# Patient Record
Sex: FEMALE | Race: WHITE | Employment: UNEMPLOYED | URBAN - METROPOLITAN AREA
[De-identification: names, ages, dates, MRNs, and addresses within clinical notes are randomized per-mention and may not be internally consistent; named-entity substitution may affect disease eponyms.]

---

## 2021-04-28 ENCOUNTER — ANESTHESIA EVENT (OUTPATIENT)
Dept: OPERATING ROOM | Age: 71
End: 2021-04-28
Payer: COMMERCIAL

## 2021-04-28 ENCOUNTER — HOSPITAL ENCOUNTER (OUTPATIENT)
Age: 71
Setting detail: OUTPATIENT SURGERY
Discharge: HOME | End: 2021-04-28
Attending: OPHTHALMOLOGY | Admitting: OPHTHALMOLOGY
Payer: COMMERCIAL

## 2021-04-28 ENCOUNTER — ANESTHESIA (OUTPATIENT)
Dept: OPERATING ROOM | Age: 71
End: 2021-04-28
Payer: COMMERCIAL

## 2021-04-28 VITALS
HEART RATE: 64 BPM | SYSTOLIC BLOOD PRESSURE: 121 MMHG | BODY MASS INDEX: 20.76 KG/M2 | OXYGEN SATURATION: 98 % | TEMPERATURE: 97 F | WEIGHT: 137 LBS | RESPIRATION RATE: 17 BRPM | HEIGHT: 68 IN | DIASTOLIC BLOOD PRESSURE: 74 MMHG

## 2021-04-28 VITALS — OXYGEN SATURATION: 100 % | DIASTOLIC BLOOD PRESSURE: 73 MMHG | SYSTOLIC BLOOD PRESSURE: 128 MMHG

## 2021-04-28 LAB
GFR NON-AFRICAN AMERICAN: >60 ML/MIN
GFR SERPL CREATININE-BSD FRML MDRD: >60 ML/MIN
GFR SERPL CREATININE-BSD FRML MDRD: NORMAL ML/MIN/{1.73_M2}
GLUCOSE BLD-MCNC: 103 MG/DL (ref 74–100)
POC BUN: 25 MG/DL (ref 8–26)
POC CREATININE: 0.87 MG/DL (ref 0.51–1.19)
SARS-COV-2, RAPID: NOT DETECTED
SPECIMEN DESCRIPTION: NORMAL

## 2021-04-28 PROCEDURE — 2709999900 HC NON-CHARGEABLE SUPPLY: Performed by: OPHTHALMOLOGY

## 2021-04-28 PROCEDURE — 82947 ASSAY GLUCOSE BLOOD QUANT: CPT

## 2021-04-28 PROCEDURE — 3600000014 HC SURGERY LEVEL 4 ADDTL 15MIN: Performed by: OPHTHALMOLOGY

## 2021-04-28 PROCEDURE — 84520 ASSAY OF UREA NITROGEN: CPT

## 2021-04-28 PROCEDURE — 3700000001 HC ADD 15 MINUTES (ANESTHESIA): Performed by: OPHTHALMOLOGY

## 2021-04-28 PROCEDURE — 2580000003 HC RX 258: Performed by: ANESTHESIOLOGY

## 2021-04-28 PROCEDURE — 7100000040 HC SPAR PHASE II RECOVERY - FIRST 15 MIN: Performed by: OPHTHALMOLOGY

## 2021-04-28 PROCEDURE — 2580000003 HC RX 258: Performed by: OPHTHALMOLOGY

## 2021-04-28 PROCEDURE — 82565 ASSAY OF CREATININE: CPT

## 2021-04-28 PROCEDURE — 2500000003 HC RX 250 WO HCPCS: Performed by: OPHTHALMOLOGY

## 2021-04-28 PROCEDURE — 6370000000 HC RX 637 (ALT 250 FOR IP): Performed by: OPHTHALMOLOGY

## 2021-04-28 PROCEDURE — 6360000002 HC RX W HCPCS: Performed by: NURSE ANESTHETIST, CERTIFIED REGISTERED

## 2021-04-28 PROCEDURE — 7100000041 HC SPAR PHASE II RECOVERY - ADDTL 15 MIN: Performed by: OPHTHALMOLOGY

## 2021-04-28 PROCEDURE — 3700000000 HC ANESTHESIA ATTENDED CARE: Performed by: OPHTHALMOLOGY

## 2021-04-28 PROCEDURE — 2500000003 HC RX 250 WO HCPCS: Performed by: NURSE ANESTHETIST, CERTIFIED REGISTERED

## 2021-04-28 PROCEDURE — 6360000002 HC RX W HCPCS: Performed by: OPHTHALMOLOGY

## 2021-04-28 PROCEDURE — 3600000004 HC SURGERY LEVEL 4 BASE: Performed by: OPHTHALMOLOGY

## 2021-04-28 PROCEDURE — 87635 SARS-COV-2 COVID-19 AMP PRB: CPT

## 2021-04-28 PROCEDURE — 93005 ELECTROCARDIOGRAM TRACING: CPT | Performed by: ANESTHESIOLOGY

## 2021-04-28 RX ORDER — SODIUM CHLORIDE, SODIUM LACTATE, POTASSIUM CHLORIDE, CALCIUM CHLORIDE 600; 310; 30; 20 MG/100ML; MG/100ML; MG/100ML; MG/100ML
INJECTION, SOLUTION INTRAVENOUS CONTINUOUS
Status: DISCONTINUED | OUTPATIENT
Start: 2021-04-28 | End: 2021-04-28 | Stop reason: HOSPADM

## 2021-04-28 RX ORDER — BALANCED SALT SOLUTION ENRICHED WITH BICARBONATE, DEXTROSE, AND GLUTATHIONE
KIT INTRAOCULAR PRN
Status: DISCONTINUED | OUTPATIENT
Start: 2021-04-28 | End: 2021-04-28 | Stop reason: ALTCHOICE

## 2021-04-28 RX ORDER — PHENYLEPHRINE HYDROCHLORIDE 100 MG/ML
1 SOLUTION/ DROPS OPHTHALMIC
Status: COMPLETED | OUTPATIENT
Start: 2021-04-28 | End: 2021-04-28

## 2021-04-28 RX ORDER — CYCLOPENTOLATE HYDROCHLORIDE 20 MG/ML
1 SOLUTION/ DROPS OPHTHALMIC
Status: COMPLETED | OUTPATIENT
Start: 2021-04-28 | End: 2021-04-28

## 2021-04-28 RX ORDER — FENTANYL CITRATE 50 UG/ML
INJECTION, SOLUTION INTRAMUSCULAR; INTRAVENOUS PRN
Status: DISCONTINUED | OUTPATIENT
Start: 2021-04-28 | End: 2021-04-28 | Stop reason: SDUPTHER

## 2021-04-28 RX ORDER — OFLOXACIN 3 MG/ML
1 SOLUTION/ DROPS OPHTHALMIC
Status: COMPLETED | OUTPATIENT
Start: 2021-04-28 | End: 2021-04-28

## 2021-04-28 RX ORDER — LIDOCAINE HYDROCHLORIDE 10 MG/ML
INJECTION, SOLUTION EPIDURAL; INFILTRATION; INTRACAUDAL; PERINEURAL PRN
Status: DISCONTINUED | OUTPATIENT
Start: 2021-04-28 | End: 2021-04-28 | Stop reason: SDUPTHER

## 2021-04-28 RX ORDER — TROPICAMIDE 10 MG/ML
1 SOLUTION/ DROPS OPHTHALMIC
Status: COMPLETED | OUTPATIENT
Start: 2021-04-28 | End: 2021-04-28

## 2021-04-28 RX ORDER — PROPOFOL 10 MG/ML
INJECTION, EMULSION INTRAVENOUS PRN
Status: DISCONTINUED | OUTPATIENT
Start: 2021-04-28 | End: 2021-04-28 | Stop reason: SDUPTHER

## 2021-04-28 RX ORDER — TROPICAMIDE 10 MG/ML
SOLUTION/ DROPS OPHTHALMIC PRN
Status: DISCONTINUED | OUTPATIENT
Start: 2021-04-28 | End: 2021-04-28 | Stop reason: ALTCHOICE

## 2021-04-28 RX ORDER — NEOMYCIN SULFATE, POLYMYXIN B SULFATE, AND DEXAMETHASONE 3.5; 10000; 1 MG/G; [USP'U]/G; MG/G
OINTMENT OPHTHALMIC PRN
Status: DISCONTINUED | OUTPATIENT
Start: 2021-04-28 | End: 2021-04-28 | Stop reason: ALTCHOICE

## 2021-04-28 RX ORDER — MIDAZOLAM HYDROCHLORIDE 1 MG/ML
INJECTION INTRAMUSCULAR; INTRAVENOUS PRN
Status: DISCONTINUED | OUTPATIENT
Start: 2021-04-28 | End: 2021-04-28 | Stop reason: SDUPTHER

## 2021-04-28 RX ADMIN — TROPICAMIDE 1 DROP: 10 SOLUTION/ DROPS OPHTHALMIC at 08:16

## 2021-04-28 RX ADMIN — CYCLOPENTOLATE HYDROCHLORIDE 1 DROP: 20 SOLUTION/ DROPS OPHTHALMIC at 07:38

## 2021-04-28 RX ADMIN — PROPOFOL 100 MG: 10 INJECTION, EMULSION INTRAVENOUS at 08:42

## 2021-04-28 RX ADMIN — LIDOCAINE HYDROCHLORIDE 50 MG: 10 INJECTION, SOLUTION EPIDURAL; INFILTRATION; INTRACAUDAL; PERINEURAL at 08:42

## 2021-04-28 RX ADMIN — MIDAZOLAM HYDROCHLORIDE 1 MG: 1 INJECTION, SOLUTION INTRAMUSCULAR; INTRAVENOUS at 08:57

## 2021-04-28 RX ADMIN — OFLOXACIN 1 DROP: 3 SOLUTION OPHTHALMIC at 07:38

## 2021-04-28 RX ADMIN — OFLOXACIN 1 DROP: 3 SOLUTION OPHTHALMIC at 08:23

## 2021-04-28 RX ADMIN — SODIUM CHLORIDE, POTASSIUM CHLORIDE, SODIUM LACTATE AND CALCIUM CHLORIDE: 600; 310; 30; 20 INJECTION, SOLUTION INTRAVENOUS at 07:55

## 2021-04-28 RX ADMIN — FENTANYL CITRATE 25 MCG: 50 INJECTION, SOLUTION INTRAMUSCULAR; INTRAVENOUS at 09:19

## 2021-04-28 RX ADMIN — PHENYLEPHRINE HYDROCHLORIDE 1 DROP: 100 SOLUTION/ DROPS OPHTHALMIC at 08:16

## 2021-04-28 RX ADMIN — OFLOXACIN 1 DROP: 3 SOLUTION OPHTHALMIC at 08:16

## 2021-04-28 RX ADMIN — CYCLOPENTOLATE HYDROCHLORIDE 1 DROP: 20 SOLUTION/ DROPS OPHTHALMIC at 08:23

## 2021-04-28 RX ADMIN — PHENYLEPHRINE HYDROCHLORIDE 1 DROP: 100 SOLUTION/ DROPS OPHTHALMIC at 07:38

## 2021-04-28 RX ADMIN — CYCLOPENTOLATE HYDROCHLORIDE 1 DROP: 20 SOLUTION/ DROPS OPHTHALMIC at 08:04

## 2021-04-28 RX ADMIN — PHENYLEPHRINE HYDROCHLORIDE 1 DROP: 100 SOLUTION/ DROPS OPHTHALMIC at 08:04

## 2021-04-28 RX ADMIN — TROPICAMIDE 1 DROP: 10 SOLUTION/ DROPS OPHTHALMIC at 08:23

## 2021-04-28 RX ADMIN — OFLOXACIN 1 DROP: 3 SOLUTION OPHTHALMIC at 08:04

## 2021-04-28 RX ADMIN — TROPICAMIDE 1 DROP: 10 SOLUTION/ DROPS OPHTHALMIC at 07:38

## 2021-04-28 RX ADMIN — TROPICAMIDE 1 DROP: 10 SOLUTION/ DROPS OPHTHALMIC at 08:04

## 2021-04-28 RX ADMIN — CYCLOPENTOLATE HYDROCHLORIDE 1 DROP: 20 SOLUTION/ DROPS OPHTHALMIC at 08:16

## 2021-04-28 ASSESSMENT — PULMONARY FUNCTION TESTS
PIF_VALUE: 0

## 2021-04-28 ASSESSMENT — PAIN SCALES - GENERAL
PAINLEVEL_OUTOF10: 0

## 2021-04-28 NOTE — FLOWSHEET NOTE
Patient engaged in conversation and shared thoughts/feelings.  provided active listening and emotional support. Patient expressed gratitude for visit. Follow as needed/requested. 04/28/21 7087   Encounter Summary   Services provided to: Patient   Referral/Consult From: Breann   Continue Visiting   (4/28/2021)   Complexity of Encounter Moderate   Length of Encounter 15 minutes   Spiritual Assessment Completed Yes   Routine   Type Pre-procedure   Assessment Coping   Intervention Explored feelings, thoughts, concerns; Active listening   Outcome Coping

## 2021-04-28 NOTE — H&P
History and Physical    Pt Name: Jaydon Nava  MRN: [id number]  YOB: 1950  Date of evaluation: 4/28/2021  Primary Care Physician: Meghan Dominguez    SUBJECTIVE:   History of Chief Complaint:    Jaydon Nava is a 79 y.o. female who is scheduled today for right vitrectomy. She reports noticing vision changes one week ago. She reports she's had \"small floaters, like a period, for months. She now reports a dark bigger visual disturbance on her nasal aspect of right vision. No prior eye surgeries/procedures. Allergies  has No Known Allergies. Medications  Prior to Admission medications    Medication Sig Start Date End Date Taking? Authorizing Provider   calcium carbonate 600 MG TABS tablet Take 1 tablet by mouth daily   Yes Historical Provider, MD   levothyroxine (SYNTHROID) 75 MCG tablet Take 75 mcg by mouth Daily   Yes Historical Provider, MD   calcium citrate-vitamin D (CITRICAL + D) 315-250 MG-UNIT TABS per tablet Take 1 tablet by mouth daily (with breakfast)   Yes Historical Provider, MD   fluticasone (FLONASE) 50 MCG/ACT nasal spray 1 spray by Nasal route daily    Historical Provider, MD   Multiple Vitamins-Minerals (THERAPEUTIC MULTIVITAMIN-MINERALS) tablet Take 1 tablet by mouth daily    Historical Provider, MD   Multiple Vitamins-Minerals (PRESERVISION AREDS) TABS Take 1 capsule by mouth daily    Historical Provider, MD   cetirizine (ZYRTEC) 10 MG tablet Take 10 mg by mouth daily    Historical Provider, MD     Past Medical History    has a past medical history of Hypertension, Osteopenia, Retinal detachment, right, Rheumatic fever, SVT (supraventricular tachycardia) ([address] and Thyroid disease. Past Surgical History   has a past surgical history that includes Wrist surgery (Left); Breast surgery (Right); Tonsillectomy; and back surgery. Social History   reports that she has never smoked. She has never used smokeless tobacco.   reports no history of alcohol use.    reports no history of drug use.  Marital Status    Children yes  Occupation retired labor and delivery nurse  Family History  Family Status   Relation Name Status    Mother   at age 63    Father   at age 80     family history includes Cancer in her mother; Heart Disease in her father; Parkinsonism in her father. OBJECTIVE:   VITALS:  height is 5' 8\" (1.727 m) and weight is 137 lb (62.1 kg). Her temporal temperature is 97 °F (36.1 °C). Her blood pressure is 135/78 and her pulse is 75. Her respiration is 16 and oxygen saturation is 100%. CONSTITUTIONAL:alert & oriented x 3, no acute distress. Friendly. Very pleasant. Thin, petite frame. SKIN:  Warm and dry, no rashes on exposed areas of skin   HEAD:  Normocephalic, atraumatic   EYES: EOMs intact. Right pupil dilated s/p pre op eye drops, left pupil reactive to light. EARS:  Equal bilaterally, no edema or thickening, skin is intact without lumps or lesions. No discharge. Hearing grossly WNL. NOSE:  Nares patent. No rhinorrhea   MOUTH/THROAT:  Mucous membranes moist, tongue is pink, uvula midline, teeth appear to be intact  NECK:supple, no lymphadenopathy  LUNGS: Respirations even and non-labored. Clear to auscultation bilaterally, no wheezes, rales, or rhonchi. CARDIOVASCULAR: Regular rate and rhythm, no murmurs/rubs/gallops   ABDOMEN: soft, non-tender, non-distended, bowel sounds active x 4   EXTREMITIES: No edema bilateral lower extremities. No varicosities bilateral lower extremities. NEUROLOGIC: CN II-XII are grossly intact. Gait not assessed.    IMPRESSIONS:   Retinal detachment- Right      Diagnosis Date    Hypertension     Osteopenia     Retinal detachment, right     Rheumatic fever     SVT (supraventricular tachycardia) ([facility] [address] 2016    s/p ablation, no recurrences    Thyroid disease      PLANS:   PARS PLANA VITRECTOMY 23 GAUGE, ENDOLASER, GAS FLUID EXCHANGE - Right    JORDIN SMITH-CNP  Electronically signed 2021 at 8:02 AM

## 2021-04-28 NOTE — ANESTHESIA PRE PROCEDURE
Department of Anesthesiology  Preprocedure Note       Name:  Warren Thomas   Age:  79 y.o.  :  1950                                          MRN:  [id number]         Date:  2021      Surgeon: Jacqueline Rivera):  Linda Jean MD    Procedure: Procedure(s):  PARS PLANA VITRECTOMY 23 GAUGE, ENDOLASER, GAS FLUID EXCHANGE    Medications prior to admission:   Prior to Admission medications    Medication Sig Start Date End Date Taking? Authorizing Provider   levothyroxine (SYNTHROID) 75 MCG tablet Take 75 mcg by mouth Daily   Yes Historical Provider, MD   calcium carbonate 600 MG TABS tablet Take 1 tablet by mouth daily    Historical Provider, MD   fluticasone (FLONASE) 50 MCG/ACT nasal spray 1 spray by Nasal route daily    Historical Provider, MD   Multiple Vitamins-Minerals (THERAPEUTIC MULTIVITAMIN-MINERALS) tablet Take 1 tablet by mouth daily    Historical Provider, MD   Multiple Vitamins-Minerals (PRESERVISION AREDS) TABS Take 1 capsule by mouth daily    Historical Provider, MD   calcium citrate-vitamin D (CITRICAL + D) 315-250 MG-UNIT TABS per tablet Take 1 tablet by mouth daily (with breakfast)    Historical Provider, MD   cetirizine (ZYRTEC) 10 MG tablet Take 10 mg by mouth daily    Historical Provider, MD       Current medications:    Current Facility-Administered Medications   Medication Dose Route Frequency Provider Last Rate Last Admin    ofloxacin (OCUFLOX) 0.3 % solution 1 drop  1 drop Right Eye Q15 Min PRN Linda Jean MD        tropicamide (MYDRIACYL) 1 % ophthalmic solution 1 drop  1 drop Right Eye Q15 Min PRN Linda Jean MD        cyclopentolate (CYCLOGYL) 2 % ophthalmic solution 1 drop  1 drop Right Eye Q15 Min PRN Linda Jean MD        phenylephrine (ANDREW-SYNEPHRINE) 10 % ophthalmic solution 1 drop  1 drop Right Eye Q15 Min PRN Linda Jean MD           Allergies:  No Known Allergies    Problem List:  There is no problem list on file for this patient. Past Medical History:        Diagnosis Date    Hypertension     Rheumatic fever     SVT (supraventricular tachycardia)     Thyroid disease        Past Surgical History:        Procedure Laterality Date    BACK SURGERY      vertebralplasty    BREAST SURGERY Right     biopsy    TONSILLECTOMY      WRIST SURGERY Left        Social History:    Social History     Tobacco Use    Smoking status: Never Smoker   Substance Use Topics    Alcohol use: No                                Counseling given: Not Answered      Vital Signs (Current):   Vitals:    04/28/21 0731 04/28/21 0737   BP:  135/78   Pulse:  75   Resp:  16   Temp:  97 °F (36.1 °C)   TempSrc:  Temporal   SpO2:  100%   Weight: 137 lb (62.1 kg)    Height: 5' 8\" (1.727 m)                                               BP Readings from Last 3 Encounters:   04/28/21 135/78   07/22/16 104/59       NPO Status:                                                                                 BMI:   Wt Readings from Last 3 Encounters:   04/28/21 137 lb (62.1 kg)   07/22/16 133 lb (60.3 kg)     Body mass index is 20.83 kg/m². CBC: No results found for: WBC, RBC, HGB, HCT, MCV, RDW, PLT    CMP:   Lab Results   Component Value Date    CREATININE 0.9 07/22/2016       POC Tests: No results for input(s): POCGLU, POCNA, POCK, POCCL, POCBUN, POCHEMO, POCHCT in the last 72 hours.     Coags: No results found for: PROTIME, INR, APTT    HCG (If Applicable): No results found for: PREGTESTUR, PREGSERUM, HCG, HCGQUANT     ABGs: No results found for: PHART, PO2ART, ONS9PAR, GQU2EDI, BEART, E7FGFRLO     Type & Screen (If Applicable):  No results found for: LABABO, LABRH    Drug/Infectious Status (If Applicable):  No results found for: HIV, HEPCAB    COVID-19 Screening (If Applicable):   Lab Results   Component Value Date    COVID19 Not Detected 04/28/2021           Anesthesia Evaluation    Airway: Mallampati: II     Neck ROM: full   Dental: normal exam Pulmonary:Negative Pulmonary ROS breath sounds clear to auscultation                             Cardiovascular:    (+) hypertension:,         Rhythm: regular  Rate: normal                 ROS comment: Hx SVT s/p ablation     Neuro/Psych:   Negative Neuro/Psych ROS              GI/Hepatic/Renal: Neg GI/Hepatic/Renal ROS            Endo/Other:    (+) hypothyroidism::., .                 Abdominal:         (-) obese     Vascular: negative vascular ROS. Anesthesia Plan      MAC     ASA 2       Induction: intravenous. Anesthetic plan and risks discussed with patient. Plan discussed with CRNA.                   Reyes Guajardo MD   4/28/2021

## 2021-04-28 NOTE — BRIEF OP NOTE
Brief Postoperative Note      Patient: Jesus Daniel  YOB: 1950  MRN: [id number]    Date of Procedure: 4/28/2021    Pre-Op Diagnosis: RETINAL DETACHMENT RIGHT EYE    Post-Op Diagnosis: Same       Procedure(s):  PARS PLANA VITRECTOMY 23 GAUGE, ENDOLASER 200MS 200MW 835 SPOTS, AIR FLUID EXCHANGE, AIR GAS EXCHANGE WITH 22%SF6    Surgeon(s):  Toya Severin, MD    Assistant:  * No surgical staff found *    Anesthesia: Monitor Anesthesia Care    Estimated Blood Loss (mL): Minimal    Complications: None    Specimens:   * No specimens in log *    Implants:  * No implants in log *      Drains: * No LDAs found *    Findings:     Electronically signed by Toya Severin, MD on 4/28/2021 at 10:40 AM

## 2021-04-29 LAB
EKG ATRIAL RATE: 60 BPM
EKG P AXIS: 65 DEGREES
EKG P-R INTERVAL: 188 MS
EKG Q-T INTERVAL: 414 MS
EKG QRS DURATION: 78 MS
EKG QTC CALCULATION (BAZETT): 414 MS
EKG R AXIS: 32 DEGREES
EKG T AXIS: 67 DEGREES
EKG VENTRICULAR RATE: 60 BPM

## 2021-04-29 PROCEDURE — 93010 ELECTROCARDIOGRAM REPORT: CPT | Performed by: INTERNAL MEDICINE

## 2021-04-29 NOTE — OP NOTE
[facility]                                OPERATIVE REPORT    PATIENT NAME: Anitra Chery                       :        1950  MED REC NO:   [id number]                             ROOM:  ACCOUNT NO:   [de-identified]                           ADMIT DATE: 2021  PROVIDER:     Alva Farmer    DATE OF PROCEDURE:  2021    PREOPERATIVE DIAGNOSIS:  Right macula-on retinal detachment. POSTOPERATIVE DIAGNOSIS:  Right macula-on retinal detachment. OPERATION PERFORMED:  Right pars plana vitrectomy, endolaser, and  air-fluid-gas exchange. SURGEON:  Alva Maldonado. Cassandra Gramajo MD    COMPLICATIONS:  None. ANESTHETIC:  MAC. BLOOD LOSS:  Zero. INDICATIONS FOR SURGERY:  The above patient presented to me two days ago  with a macula-on retinal detachment to the right eye. She had three  retinal breaks superotemporally through lattice inferiorly. She had a  significant cataract, but visibility was reasonable enough for  vitrectomy. I did discuss the possibility of pneumatic retinopexy, but  she had severe blepharospasm, very deep-set eyes and could not cooperate  during the initial performance of the __retina ___ examination. So, she is not  a good candidate for pneumatic retinopexy. Therefore, it is reasonable  for her to undergo  vitrectomy surgery. I have discussed risks and  benefits of surgery with the patient including 100% likelihood that she  will get worsening cataract requiring cataract surgery. I discussed the  possibility of bleeding, infection, double vision, retinal attachment  due to recurrent retinal breaks, new retinal breaks, PVR, glaucoma,  corneal damage, loss of vision, blindness, and loss of eye. The patient  understood these risks and was willing to proceed with surgery.     OPERATIVE PROCEDURE:  After informed consent was obtained, the patient  underwent a right retrobulbar block. A total of 8 mL of 2% lidocaine  was used with 0.75% Marcaine in a 50:50 mixture without epinephrine. The patient's right eye was draped and prepped in the usual sterile  fashion. In anticipation for a 23-gauge surgery, Betadine was placed in  the conjunctiva as prophylaxis against infection. Measuring 4 mm  posterior to the limbus in the inferotemporal quadrant, the conjunctiva  was deflected towards the cornea, followed by a 23-gauge trocar in a  2-step oblique fashion, followed by the infusion cannula in the off  position. Care was taken to ensure the infusion cannula was seen in the  vitreous cavity prior to turning the infusion cannula on. It was then  turned on to 35. Similar maneuver was done superotemporally and  superonasally by deflecting the conjunctiva towards the cornea, followed  by 23-gauge trocar 4 mm posterior to the limbus. Using the BIOM lens  system, a core vitrectomy was carried out. There was a definite PVD. The posterior hyaloid was then removed 360 degrees out to the vitreous  base using the vitreous shape mode on the osteotome. Careful attention  was done pertaining to the retinal breaks and I removed the anterior  flaps of all the retinal breaks and then removed all vitreous tractions  and the breaks. I diathermized all the retinal breaks to identify the  air-fluid exchange. I then did fluid exchange and drainage of the fluid  under the retina with a silicone-tipped extrusion needle. The retinal  breaks were flattened and I placed endolaser around all the retinal  breaks out to the OR in the superotemporal quadrant. Some laser was  placed inferiorly around the lattice as well and there was some lattice  superiorly and I placed laser treatment around that as well. Then, a  repeat air-fluid exchange was carried out to dehydrate the vitreous  cavity, 45 mL of 22% SF6 gas was injected through the infusion cannula  and vented out with a 27-gauge needle.   The two superior trocars were  removed. These are leaked gas and therefore, I closed them with single  7-0 gut suture. The infusion cannula was also leaked, afterwards  removed, and I closed that with 7-0 Vicryl suture. Subconjunctival  vancomycin was given superiorly and inferiorly. Digital palpation  confirmed the IOP was normal.  Because the macula remained attached and  the fluid was several disk diameters away from the macula, I put the  patient on left side down positioning, right side up to tamponade the  retinal breaks.         Moses Fox    D: 04/28/2021 22:28:48       T: 04/28/2021 23:51:55     [id number]  Job#: [id number]     Doc#: [id number]    CC:

## (undated) DEVICE — SUTURE CHROMIC GUT SZ 7-0 L18IN ABSRB BLU L6.5MM TG140-8 1797G

## (undated) DEVICE — SYRINGE MED 1ML POLYCARB LUERLOCK HUB

## (undated) DEVICE — SOLUTION SCRB 4OZ 10% POVIDONE IOD ANTIMIC BTL

## (undated) DEVICE — 23 GA FLEXIBLE TIP LASER PROBE: Brand: ALCON, ENGAUGE

## (undated) DEVICE — RETINA PK

## (undated) DEVICE — GOWN,SIRUS,NONRNF,SETINSLV,XL,20/CS: Brand: MEDLINE

## (undated) DEVICE — SURGICAL PROCEDURE PACK 23 GA VITRECTOMY

## (undated) DEVICE — DRESSING TRNSPAR W2XL2.75IN FLM SHT SEMIPERMEABLE WIND

## (undated) DEVICE — NEEDLE HYPO 27GA L0.5IN GRY POLYPR HUB S STL REG BVL STR

## (undated) DEVICE — SYRINGE MED 10ML LUERLOCK TIP W/O SFTY DISP

## (undated) DEVICE — OCCLUDER EYE POLYETH HYPOALRG ADH TAPE W/O PINHOLE

## (undated) DEVICE — SYRINGE ST FILTERS W/MCE MEMBRAN

## (undated) DEVICE — NEEDLE FLTR 18GA L1.5IN MEM THK5UM BLNT DISP

## (undated) DEVICE — SOLUTION IRRIG 1000ML PENTALYTE PLAS POUR BTL TIS U SOL

## (undated) DEVICE — SYRINGE MED 50ML LUERLOCK TIP

## (undated) DEVICE — 23GA EZPASS SOFT TIP CANNULA BOX OF 5: Brand: VORTEX SURGICAL INC

## (undated) DEVICE — STANDARD HYPODERMIC NEEDLE,ALUMINUM HUB: Brand: MONOJECT

## (undated) DEVICE — 1 EACH 40411 STERILE DISPOSABLE SUPER VIEW® LENS SET & 1 EACH 40100 STERILE MICROSCOPE DRAPE: Brand: SUPER VIEW® PACK

## (undated) DEVICE — CAUTERY SURG 23GA BPLR 6 PER BX

## (undated) DEVICE — SYRINGE MED 5ML STD CLR PLAS LUERLOCK TIP N CTRL DISP

## (undated) DEVICE — SOLUTION PREP POVIDONE IOD FOR SKIN MUCOUS MEM PRIOR TO

## (undated) DEVICE — OCCUCOAT SYRINGE 1ML 6PK: Brand: OCCUCOAT

## (undated) DEVICE — NEEDLE HYPO 30GA L0.5IN BGE POLYPR HUB S STL REG BVL STR